# Patient Record
(demographics unavailable — no encounter records)

---

## 2025-03-11 NOTE — PHYSICAL EXAM
[General Appearance - Well Developed] : well developed [Normal Appearance] : normal appearance [Well Groomed] : well groomed [General Appearance - Well Nourished] : well nourished [No Deformities] : no deformities [General Appearance - In No Acute Distress] : no acute distress [Normal Conjunctiva] : the conjunctiva exhibited no abnormalities [Eyelids - No Xanthelasma] : the eyelids demonstrated no xanthelasmas [Normal Oral Mucosa] : normal oral mucosa [No Oral Pallor] : no oral pallor [No Oral Cyanosis] : no oral cyanosis [Normal Jugular Venous A Waves Present] : normal jugular venous A waves present [Normal Jugular Venous V Waves Present] : normal jugular venous V waves present [No Jugular Venous Pro A Waves] : no jugular venous pro A waves [Heart Rate And Rhythm] : heart rate and rhythm were normal [Heart Sounds] : normal S1 and S2 [Murmurs] : no murmurs present [Arterial Pulses Normal] : the arterial pulses were normal [Edema] : no peripheral edema present [Respiration, Rhythm And Depth] : normal respiratory rhythm and effort [Exaggerated Use Of Accessory Muscles For Inspiration] : no accessory muscle use [Auscultation Breath Sounds / Voice Sounds] : lungs were clear to auscultation bilaterally [Abdomen Soft] : soft [Abdomen Tenderness] : non-tender [Abdomen Mass (___ Cm)] : no abdominal mass palpated [Abnormal Walk] : normal gait [Gait - Sufficient For Exercise Testing] : the gait was sufficient for exercise testing [Nail Clubbing] : no clubbing of the fingernails [Cyanosis, Localized] : no localized cyanosis [Petechial Hemorrhages (___cm)] : no petechial hemorrhages [] : no ischemic changes [Oriented To Time, Place, And Person] : oriented to person, place, and time [Affect] : the affect was normal [Mood] : the mood was normal [No Anxiety] : not feeling anxious

## 2025-03-11 NOTE — HISTORY OF PRESENT ILLNESS
[FreeTextEntry1] : 3/12/25 -   7/23/24-  Patient reports palpitations in early morning 3-4am that wakes her up once or twice a month lasting 20 minutes. Patient takes Propranolol 120 mg PRN. Patient reports lower CP with exertion. I advised patient to undergo an echocardiogram and a treadmill stress test.  Patient underwent an echocardiogram and it showed normal LV function without significant valvular pathology. Patient underwent a treadmill stress test and completed 9 minutes of Remy protocol.  There were upsloping ST depressions on ECG but no symptoms.  Following treadmill stress, there was no echocardiographic evidence of ischemia.   6/6/23 - Pt reports episodes palpitations, HR up to 120s and took Propranolol for 5 days. She reports occasional caffeine intake. She reports occasional left sided sharp chest pain lasting seconds while walking and also L shoulder pain.  She exercises for 1 hr every day. She gained 10 lbs since last year with better appetite. I advised patient to undergo an echocardiogram. I advised patient to wear Event monitor.  Patient underwent an echocardiogram and it showed normal LV function without significant valvular pathology.  Patient wore a 7-day monitor and it showed average HR 89, 4 PVCs, rare PACs, couplets, and 1 triplet.  7/14/22-  Patient reports dizziness and posterior HA and tightness for the past few days.  Patient reports h/o right eye fasciculation associated with palpitations for several years.  Patient reports that she had brain MRI a year ago which showed a small stroke.  She is on ASA 81 mg for stroke prevention.  Her Atorvastatin was decreased to 10 mg due to abnormal LFT's.  She takes Propranolol 10 mg as needed for palpitations.  She is scheduled to see neurology again.  I advised patient to undergo an echocardiogram.  I advised patient to wear a Holter monitor.  Patient underwent an echocardiogram and it showed normal LV function without significant valvular pathology.  Patient wore a Holter and it showed average HR 80, 1 PVC, rare PACs without significant arrhythmia.  4/20/21 - Patient reports that for the past few days she has been experiencing SSCP, described as tightness, not related to exertion, tender to touch.  Patient reports palpitations, described as fast heartbeats, interfering with her sleep.  Patient reports ROLAND.  Patient reports one episode of syncope in 2018 when she was waiting to pay after shopping at [employer].  She went to ER and workup was unremarkable.  She has recurrent kidney stone and needs cardiac clearance prior to lithotripsy.  Patient underwent an echocardiogram and it showed normal LV function without significant valvular pathology. Patient underwent a treadmill stress test and completed 10 minutes of Remy protocol. There were upsloping ST depressions on ECG but no symptoms. Following treadmill stress, there was no echocardiographic evidence of ischemia.  Patient wore a Holter and it showed rare APC's and short runs of PAT (5 beats the longest).

## 2025-03-11 NOTE — REASON FOR VISIT
[FreeTextEntry1] : 59 year-old female with stroke on MRI, HLD, kidney stones, presents for followup.    Patient was last seen on 7/23/24 ->  Patient reports palpitations in early morning 3-4am that wakes her up once or twice a month lasting 20 minutes. Patient takes Propranolol 120 mg PRN. Patient reports lower CP with exertion. I advised patient to undergo an echocardiogram and a treadmill stress test.  Patient underwent an echocardiogram and it showed normal LV function without significant valvular pathology. Patient underwent a treadmill stress test and completed 9 minutes of Remy protocol.  There were upsloping ST depressions on ECG but no symptoms.  Following treadmill stress, there was no echocardiographic evidence of ischemia.   She is on ASA 81 mg for stroke prevention.  Her Atorvastatin was decreased to 10 mg due to abnormal LFT's.  She takes Propranolol 10 mg as needed for palpitations.    Patient underwent an echocardiogram 6/6/23 and it showed normal LV function without significant valvular pathology.    Patient wore a 7-day monitor 6/6/23 and it showed average HR 89, 4 PVCs, rare PACs, couplets, and 1 triplet.  Patient underwent an echocardiogram 7/14/22 and it showed normal LV function without significant valvular pathology.    Patient wore a Holter 7/14/22 and it showed average HR 80, 1 PVC, rare PACs without significant arrhythmia.  Patient underwent an echocardiogram 4/20/21 and it showed normal LV function without significant valvular pathology.   Patient underwent a treadmill stress test 4/20/21 and completed 10 minutes of Remy protocol. There were upsloping ST depressions on ECG but no symptoms. Following treadmill stress, there was no echocardiographic evidence of ischemia.    Patient wore a Holter 4/20/21 and it showed rare APC's and short runs of PAT (5 beats the longest).

## 2025-03-29 NOTE — HISTORY OF PRESENT ILLNESS
[FreeTextEntry1] : 59F w/ HTN, HLD, stroke on MRI, no residual deficits, and chronic palpitation here for add on visit.   Seen by Dr. Mcdowell in the past, most recently on 3/12/2025.  Current meds= toprol XL 25 mg QD, propranolol 10 mg PRN for palpitation, atorva 20 mg qhs  EKG 3/29- sinus tachycardia, no sig STT changes  TTE 7/23/2024- normal EF no sig VHD or WMAs  STR 3/12/2025- exercised 9 mins, upsloping STD, no echocardiographic evidence of ischemia   AP  1. Palpitations- sinus tachycardia. likely due to low sugar from new DM med  -to increase to metoprolol tartrate 25 mg BID  -propranolol 10 mg PRN for addition palpitation sxs   2. Chest discomfort  -prior CVA, STR w/ upsloping STD  -rec CCTA at [facility]   3. F/u- instructed to pt to F/u with Dr Mcdowell for CCTA review and RPA for sxs   Elvin Granados MD [facility] Interventional Cardiology Attending [facility] Tel: [phone] Mobile: [phone] - [place] Go: [email or web address]